# Patient Record
Sex: MALE | Race: WHITE | Employment: FULL TIME | URBAN - NONMETROPOLITAN AREA
[De-identification: names, ages, dates, MRNs, and addresses within clinical notes are randomized per-mention and may not be internally consistent; named-entity substitution may affect disease eponyms.]

---

## 2022-12-12 ENCOUNTER — OFFICE VISIT (OUTPATIENT)
Dept: PRIMARY CARE CLINIC | Age: 42
End: 2022-12-12
Payer: COMMERCIAL

## 2022-12-12 VITALS
DIASTOLIC BLOOD PRESSURE: 80 MMHG | WEIGHT: 195.3 LBS | HEIGHT: 72 IN | SYSTOLIC BLOOD PRESSURE: 113 MMHG | RESPIRATION RATE: 18 BRPM | OXYGEN SATURATION: 98 % | BODY MASS INDEX: 26.45 KG/M2 | TEMPERATURE: 98.7 F | HEART RATE: 79 BPM

## 2022-12-12 DIAGNOSIS — R68.89 FLU-LIKE SYMPTOMS: ICD-10-CM

## 2022-12-12 DIAGNOSIS — J10.1 INFLUENZA A: Primary | ICD-10-CM

## 2022-12-12 PROBLEM — E66.3 OVERWEIGHT WITH BODY MASS INDEX (BMI) 25.0-29.9: Status: ACTIVE | Noted: 2022-12-12

## 2022-12-12 PROBLEM — F17.200 NICOTINE DEPENDENCE: Status: ACTIVE | Noted: 2022-12-12

## 2022-12-12 PROBLEM — E66.3 OVERWEIGHT: Status: ACTIVE | Noted: 2022-12-12

## 2022-12-12 PROBLEM — J30.1 ALLERGIC RHINITIS DUE TO POLLEN: Status: ACTIVE | Noted: 2022-12-12

## 2022-12-12 PROBLEM — F32.4 MAJOR DEPRESSIVE DISORDER IN PARTIAL REMISSION (HCC): Status: ACTIVE | Noted: 2022-12-12

## 2022-12-12 PROBLEM — E66.3 OVERWEIGHT: Status: RESOLVED | Noted: 2022-12-12 | Resolved: 2022-12-12

## 2022-12-12 LAB
INFLUENZA A ANTIBODY: ABNORMAL
INFLUENZA B ANTIBODY: ABNORMAL
KIT LOT NO., HCLOLOT: NORMAL
SARS-COV-2, POC: NORMAL
VALID INTERNAL CONTROL, POC: PRESENT
VENDOR AND KIT NAME POC: NORMAL

## 2022-12-12 PROCEDURE — 99202 OFFICE O/P NEW SF 15 MIN: CPT | Performed by: NURSE PRACTITIONER

## 2022-12-12 PROCEDURE — 87804 INFLUENZA ASSAY W/OPTIC: CPT | Performed by: NURSE PRACTITIONER

## 2022-12-12 RX ORDER — LORATADINE 10 MG/1
10 TABLET ORAL
COMMUNITY
Start: 2022-02-22

## 2022-12-12 RX ORDER — OSELTAMIVIR PHOSPHATE 75 MG/1
75 CAPSULE ORAL 2 TIMES DAILY
Qty: 10 CAPSULE | Refills: 0 | Status: SHIPPED | OUTPATIENT
Start: 2022-12-12 | End: 2022-12-17

## 2022-12-12 RX ORDER — ESCITALOPRAM OXALATE 20 MG/1
TABLET ORAL
COMMUNITY
Start: 2022-11-10

## 2022-12-12 ASSESSMENT — ENCOUNTER SYMPTOMS
VOMITING: 0
COUGH: 1
WHEEZING: 0
NAUSEA: 0
FLU SYMPTOMS: 1
RHINORRHEA: 0
SHORTNESS OF BREATH: 0
DIARRHEA: 0
SORE THROAT: 1

## 2022-12-12 NOTE — LETTER
[facility] [address]  Phone: [phone]  Fax: Martha Jennings 46, APRN - CNP        December 12, 2022     Patient: Ricardo Cardenas   YOB: 1980   Date of Visit: 12/12/2022       To Whom it May Concern: Royal Andrea was seen in my clinic on 12/12/2022. He may return to work on 12/16/2022. Please excuse for 12/12/2022, 12/13/2022, 12/14/2022 and 12/15/2022,    If you have any questions or concerns, please don't hesitate to call.     Sincerely,         Maria Teresa Dunlap, ELMA - CNP

## 2022-12-12 NOTE — PROGRESS NOTES
[facility]  [address] [facility] [address]  Dept: [phone]  Dept Fax: [phone]    Miranda Galarza is a 38 y.o. male who presents to the [facility] in Care today for hismedical conditions/complaints as noted below. Miranda Galarza is c/o of Influenza (Started Friday-Body aches, congestion, fatigue, cough, scratchy throat, headaches, fever.)      HPI:     Influenza  This is a new problem. The current episode started in the past 7 days (Started on Friday night with variable cough, congestion, fatigue, body aches, headahce, scratchy throat and tactile fever. Denies known exposure to Covid-19 or influenza. Works at [employer]. ). The problem occurs intermittently. The problem has been gradually worsening. Associated symptoms include chills, congestion, coughing (Dry and productive at times.), diaphoresis, fatigue (Slept 17 hours from Friday in to Saturday.), a fever, headaches, myalgias and a sore throat. Pertinent negatives include no anorexia, nausea, rash or vomiting. Nothing aggravates the symptoms. Treatments tried: Coricidin, Nyquil. The treatment provided mild relief. History reviewed. No pertinent past medical history. Current Outpatient Medications   Medication Sig Dispense Refill    escitalopram (LEXAPRO) 20 MG tablet TAKE 1 TABLET BY MOUTH DAILY      loratadine (CLARITIN) 10 MG tablet Take 10 mg by mouth      oseltamivir (TAMIFLU) 75 MG capsule Take 1 capsule by mouth 2 times daily for 5 days 10 capsule 0     No current facility-administered medications for this visit. No Known Allergies    :     Review of Systems   Constitutional:  Positive for chills, diaphoresis, fatigue (Slept 17 hours from Friday in to Saturday.) and fever. Negative for appetite change. HENT:  Positive for congestion and sore throat. Negative for ear pain and rhinorrhea.     Respiratory:  Positive for cough (Dry and productive at times. ). Negative for shortness of breath and wheezing. Gastrointestinal:  Negative for anorexia, diarrhea, nausea and vomiting. Musculoskeletal:  Positive for myalgias. Skin:  Negative for rash and wound. Neurological:  Positive for light-headedness and headaches. Negative for dizziness.     :     Physical Exam  Vitals and nursing note reviewed. Constitutional:       General: He is not in acute distress. Appearance: Normal appearance. He is well-developed. He is not ill-appearing or diaphoretic. Comments: Well hydrated, nontoxic appearance. HENT:      Head: Normocephalic and atraumatic. Right Ear: Hearing, ear canal and external ear normal. A middle ear effusion (Pale white fluid.) is present. No mastoid tenderness. Tympanic membrane is not injected, erythematous or bulging. Left Ear: Hearing, ear canal and external ear normal. A middle ear effusion (Pale white fluid.) is present. No mastoid tenderness. Tympanic membrane is not injected, erythematous or bulging. Nose: Mucosal edema and congestion present. No rhinorrhea. Right Sinus: No maxillary sinus tenderness or frontal sinus tenderness. Left Sinus: No maxillary sinus tenderness or frontal sinus tenderness. Mouth/Throat:      Lips: Pink. Mouth: Mucous membranes are moist.      Pharynx: Oropharynx is clear. Uvula midline. No pharyngeal swelling, oropharyngeal exudate, posterior oropharyngeal erythema or uvula swelling. Eyes:      General:         Right eye: No discharge. Left eye: No discharge. Conjunctiva/sclera: Conjunctivae normal.      Pupils: Pupils are equal, round, and reactive to light. Cardiovascular:      Rate and Rhythm: Normal rate and regular rhythm. Heart sounds: Normal heart sounds, S1 normal and S2 normal. No murmur heard. No friction rub. No gallop. Pulmonary:      Effort: Pulmonary effort is normal. No accessory muscle usage or respiratory distress. Breath sounds: Normal breath sounds and air entry. No decreased breath sounds, wheezing, rhonchi or rales. Comments: Occasional dry cough. Breath sounds clear B/L anterior and posterior lobes. Chest expansion symmetrical.  No audible wheezing or respiratory distress. No rales or rhonchi. Abdominal:      General: Bowel sounds are normal.      Palpations: Abdomen is soft. Tenderness: There is no abdominal tenderness. Musculoskeletal:         General: Normal range of motion. Lymphadenopathy:      Cervical: No cervical adenopathy. Right cervical: No superficial or posterior cervical adenopathy. Left cervical: No superficial or posterior cervical adenopathy. Skin:     General: Skin is warm and dry. Coloration: Skin is not pale. Findings: No erythema or rash. Neurological:      Mental Status: He is alert and oriented to person, place, and time. Psychiatric:         Behavior: Behavior normal. Behavior is cooperative. /80 (Site: Right Upper Arm, Position: Sitting, Cuff Size: Medium Adult)   Pulse 79   Temp 98.7 °F (37.1 °C) (Oral)   Resp 18   Ht 6' (1.829 m)   Wt 195 lb 4.8 oz (88.6 kg)   SpO2 98%   BMI 26.49 kg/m²     Results for orders placed or performed in visit on 12/12/22   POCT Influenza A/B   Result Value Ref Range    Influenza A Ab POS     Influenza B Ab NEG    POC COVID-19   Result Value Ref Range    SARS-COV-2, POC Not-Detected Not Detected    Valid Internal Control, POC PRESENT     Kit lot no. [id number]     Vendor and kit name [employer]       :      Diagnosis Orders   1. Influenza A  oseltamivir (TAMIFLU) 75 MG capsule      2. Flu-like symptoms  POCT Influenza A/B    POC COVID-19          :      Return for Resume all previous medications as directed.     Orders Placed This Encounter   Medications    oseltamivir (TAMIFLU) 75 MG capsule     Sig: Take 1 capsule by mouth 2 times daily for 5 days     Dispense:  10 capsule     Refill:  0      Practice meticulous handwashing and cover cough to prevent spread of infection. Do not return to work until symptoms have resolved and no fever for 24 hrs without medication. Tamiflu 1 tablet twice a day for 5 days. Take all doses until completed. Encouraged to increase fluids and rest  Tylenol/Ibuprofen OTC PRN for pain, discomfort or fever as directed on package  Warm salt water gargles for sore throat  Cool mist humidifier  Hot tea with honey and lemon for cough PRN  Patient instructions given for influenza and tamiflu. To ER or call 911 if any difficulty breathing, shortness of breath, inability to swallow, hives, rash, facial/tongue swelling or temp greater than 103 degrees. Follow up with PCP or Walk in Care as needed if symptoms worsen or do not improve. Kevin received counseling on the following healthy behaviors: medication adherence, increased fluids and rest.  Patient given educational materials - see patient instructions. Discussed use, benefit, and side effects of prescribed medications. Treatment plan discussed at visit. Continue routine health care follow up. All patient questions answered. Pt voiced understanding.       Electronically signed by ELMA Perera CNP on 12/12/2022 at 11:17 AM

## 2022-12-12 NOTE — PATIENT INSTRUCTIONS
SURVEY:    You may be receiving a survey from [employer] regarding your visit today. Please complete the survey to enable us to provide the highest quality of care to you and your family. If you cannot score us a very good on any question, please call the office to discuss how we could of made your experience a very good one. Thank you for letting us take care of you today. We hope all your questions were addressed. If a question was overlooked or something else comes to mind after you return home, please contact a member of your Care Team listed below. Thank you,  Max You MA      Your Care Team at [address] [facility]  Provider- RENAE Schaeffer  Provider- RENAE Smallwood  [address] [place] [address]  [facility] [place]      Walk-in contact numbers:       Phone: [phone]                 Fax: [phone]    [facility] Walk-[facility] Hours:  Mon-Thurs: 9:00 am - 5:30 pm     Friday: 8:00 am - 12:00 pm           Sat-Sun: CLOSED       Practice meticulous handwashing and cover cough to prevent spread of infection. Do not return to work until symptoms have resolved and no fever for 24 hrs without medication. Tamiflu 1 tablet twice a day for 5 days. Take all doses until completed. Encouraged to increase fluids and rest  Tylenol/Ibuprofen OTC PRN for pain, discomfort or fever as directed on package  Warm salt water gargles for sore throat  Cool mist humidifier  Hot tea with honey and lemon for cough PRN  Patient instructions given for influenza and tamiflu. To ER or call 911 if any difficulty breathing, shortness of breath, inability to swallow, hives, rash, facial/tongue swelling or temp greater than 103 degrees. Follow up with PCP or Walk in Care as needed if symptoms worsen or do not improve.